# Patient Record
Sex: MALE | Race: OTHER
[De-identification: names, ages, dates, MRNs, and addresses within clinical notes are randomized per-mention and may not be internally consistent; named-entity substitution may affect disease eponyms.]

---

## 2018-02-24 ENCOUNTER — HOSPITAL ENCOUNTER (EMERGENCY)
Dept: HOSPITAL 72 - EMR | Age: 48
Discharge: HOME | End: 2018-02-24
Payer: SELF-PAY

## 2018-02-24 VITALS — BODY MASS INDEX: 39.99 KG/M2 | HEIGHT: 69 IN | WEIGHT: 270 LBS

## 2018-02-24 VITALS — DIASTOLIC BLOOD PRESSURE: 84 MMHG | SYSTOLIC BLOOD PRESSURE: 154 MMHG

## 2018-02-24 DIAGNOSIS — G93.0: ICD-10-CM

## 2018-02-24 DIAGNOSIS — R51: ICD-10-CM

## 2018-02-24 DIAGNOSIS — R42: ICD-10-CM

## 2018-02-24 DIAGNOSIS — I10: Primary | ICD-10-CM

## 2018-02-24 LAB
ADD MANUAL DIFF: YES
ALBUMIN SERPL-MCNC: 4 G/DL (ref 3.4–5)
ALBUMIN/GLOB SERPL: 0.9 {RATIO} (ref 1–2.7)
ALP SERPL-CCNC: 111 U/L (ref 46–116)
ALT SERPL-CCNC: 27 U/L (ref 12–78)
ANION GAP SERPL CALC-SCNC: 7 MMOL/L (ref 5–15)
APPEARANCE UR: CLEAR
APTT PPP: (no result) S
AST SERPL-CCNC: 15 U/L (ref 15–37)
BILIRUB SERPL-MCNC: 0.4 MG/DL (ref 0.2–1)
BUN SERPL-MCNC: 11 MG/DL (ref 7–18)
CALCIUM SERPL-MCNC: 9.3 MG/DL (ref 8.5–10.1)
CHLORIDE SERPL-SCNC: 98 MMOL/L (ref 98–107)
CO2 SERPL-SCNC: 30 MMOL/L (ref 21–32)
CREAT SERPL-MCNC: 1.1 MG/DL (ref 0.55–1.3)
ERYTHROCYTE [DISTWIDTH] IN BLOOD BY AUTOMATED COUNT: 12 % (ref 11.6–14.8)
GLOBULIN SER-MCNC: 4.5 G/DL
GLUCOSE UR STRIP-MCNC: (no result) MG/DL
HCT VFR BLD CALC: 46.7 % (ref 42–52)
HGB BLD-MCNC: 16 G/DL (ref 14.2–18)
KETONES UR QL STRIP: (no result)
LEUKOCYTE ESTERASE UR QL STRIP: NEGATIVE
MCV RBC AUTO: 89 FL (ref 80–99)
NITRITE UR QL STRIP: NEGATIVE
PH UR STRIP: 6.5 [PH] (ref 4.5–8)
PLATELET # BLD: 255 K/UL (ref 150–450)
POTASSIUM SERPL-SCNC: 3.7 MMOL/L (ref 3.5–5.1)
PROT UR QL STRIP: (no result)
RBC # BLD AUTO: 5.25 M/UL (ref 4.7–6.1)
SODIUM SERPL-SCNC: 135 MMOL/L (ref 136–145)
SP GR UR STRIP: 1.01 (ref 1–1.03)
UROBILINOGEN UR-MCNC: NORMAL MG/DL (ref 0–1)
WBC # BLD AUTO: 12.9 K/UL (ref 4.8–10.8)

## 2018-02-24 PROCEDURE — 70450 CT HEAD/BRAIN W/O DYE: CPT

## 2018-02-24 PROCEDURE — 84484 ASSAY OF TROPONIN QUANT: CPT

## 2018-02-24 PROCEDURE — 99284 EMERGENCY DEPT VISIT MOD MDM: CPT

## 2018-02-24 PROCEDURE — 96374 THER/PROPH/DIAG INJ IV PUSH: CPT

## 2018-02-24 PROCEDURE — 85007 BL SMEAR W/DIFF WBC COUNT: CPT

## 2018-02-24 PROCEDURE — 36415 COLL VENOUS BLD VENIPUNCTURE: CPT

## 2018-02-24 PROCEDURE — 80307 DRUG TEST PRSMV CHEM ANLYZR: CPT

## 2018-02-24 PROCEDURE — 80053 COMPREHEN METABOLIC PANEL: CPT

## 2018-02-24 PROCEDURE — 96375 TX/PRO/DX INJ NEW DRUG ADDON: CPT

## 2018-02-24 PROCEDURE — 85025 COMPLETE CBC W/AUTO DIFF WBC: CPT

## 2018-02-24 PROCEDURE — 81003 URINALYSIS AUTO W/O SCOPE: CPT

## 2018-02-24 NOTE — EMERGENCY ROOM REPORT
History of Present Illness


General


Chief Complaint:  Hypertension


Source:  Patient





Present Illness


HPI


Patient is a 47-year-old male who presented after increased dizziness and 

headache.  Patient reported having a no definite past medical history.  He 

reports having increased vomiting as well as headache.  He denies any recent 

trauma.  He reports drinking alcohol 4 days ago but does not drink alcohol 

daily.Patient denies any fever.  He denies any abdominal pain.


Allergies:  


Coded Allergies:  


     No Known Allergies (Unverified , 2/24/18)





Patient History


Past Medical History:  see triage record


Reviewed Nursing Documentation:  PMH: Agreed, PSxH: Agreed





Nursing Documentation-PMH


Past Medical History:  No Stated History


Hx Hypertension:  Yes





Review of Systems


All Other Systems:  negative except mentioned in HPI





Physical Exam





Vital Signs








  Date Time  Temp Pulse Resp B/P (MAP) Pulse Ox O2 Delivery O2 Flow Rate FiO2


 


2/24/18 14:59 97.4 76 22 197/108 95 Room Air  





 97.3       








Sp02 EP Interpretation:  reviewed, normal


General Appearance:  normal inspection, well appearing, no apparent distress, 

alert, GCS 15


Head:  atraumatic


ENT:  normal ENT inspection, hearing grossly normal, normal voice


Neck:  normal inspection, full range of motion, supple, no bony tend


Respiratory:  normal inspection, lungs clear, normal breath sounds, no 

respiratory distress, no retraction, no wheezing


Cardiovascular #1:  regular rate, rhythm, no edema


Gastrointestinal:  normal inspection, normal bowel sounds, non tender, soft, no 

guarding, no hernia


Genitourinary:  no CVA tenderness


Musculoskeletal:  normal inspection, back normal, normal range of motion


Neurologic:  normal inspection, alert, responsive, speech normal


Psychiatric:  normal inspection, judgement/insight normal, mood/affect normal


Skin:  normal inspection, normal color, no rash





Medical Decision Making


Diagnostic Impression:  


 Primary Impression:  


 Hypertension


 Additional Impression:  


 Arachnoid cyst


ER Course


Patient presented for headache.Differential diagnoses included but was not 

limited to skull fracture, subarachnoid hemorrhage, meningitis, aneurysm, mass 

lesion, intracranial hemorrhage.


Because of complexity of patient's case laboratory testing and imaging studies 

were ordered.


CT the head read by radiology showed evidence of arachnoid cyst there is no 

evidence of acute hemorrhage or CVA.  Patient was given medications for 

hypertension.  Patient had subsequent improvement in his headache.  The patient 

given prescription for blood pressure medications.The patient is advised to 

follow up with  primary care doctor in 1-2 days.  Patient is advised to return 

if any worsening condition or if any changes in status that are concerning.





This report is dictated with Dragon transcription software which may 

occasionally lead to discrepancies related to use of this software.





Labs








Test


  2/24/18


15:29 2/24/18


16:45


 


White Blood Count


  12.9 K/UL


(4.8-10.8) 


 


 


Red Blood Count


  5.25 M/UL


(4.70-6.10) 


 


 


Hemoglobin


  16.0 G/DL


(14.2-18.0) 


 


 


Hematocrit


  46.7 %


(42.0-52.0) 


 


 


Mean Corpuscular Volume 89 FL (80-99)  


 


Mean Corpuscular Hemoglobin


  30.5 PG


(27.0-31.0) 


 


 


Mean Corpuscular Hemoglobin


Concent 34.3 G/DL


(32.0-36.0) 


 


 


Red Cell Distribution Width


  12.0 %


(11.6-14.8) 


 


 


Platelet Count


  255 K/UL


(150-450) 


 


 


Mean Platelet Volume


  8.0 FL


(6.5-10.1) 


 


 


Neutrophils (%) (Auto)  % (45.0-75.0)  


 


Lymphocytes (%) (Auto)  % (20.0-45.0)  


 


Monocytes (%) (Auto)  % (1.0-10.0)  


 


Eosinophils (%) (Auto)  % (0.0-3.0)  


 


Basophils (%) (Auto)  % (0.0-2.0)  


 


Differential Total Cells


Counted 100 


  


 


 


Neutrophils % (Manual) 87 % (45-75)  


 


Lymphocytes % (Manual) 9 % (20-45)  


 


Monocytes % (Manual) 3 % (1-10)  


 


Eosinophils % (Manual) 0 % (0-3)  


 


Basophils % (Manual) 1 % (0-2)  


 


Band Neutrophils 0 % (0-8)  


 


Platelet Estimate Adequate  


 


Platelet Morphology Normal  


 


Red Blood Cell Morphology Normal  


 


Sodium Level


  135 MMOL/L


(136-145) 


 


 


Potassium Level


  3.7 MMOL/L


(3.5-5.1) 


 


 


Chloride Level


  98 MMOL/L


() 


 


 


Carbon Dioxide Level


  30 MMOL/L


(21-32) 


 


 


Anion Gap


  7 mmol/L


(5-15) 


 


 


Blood Urea Nitrogen


  11 mg/dL


(7-18) 


 


 


Creatinine


  1.1 MG/DL


(0.55-1.30) 


 


 


Estimat Glomerular Filtration


Rate > 60 mL/min


(>60) 


 


 


Glucose Level


  280 MG/DL


() 


 


 


Calcium Level


  9.3 MG/DL


(8.5-10.1) 


 


 


Total Bilirubin


  0.4 MG/DL


(0.2-1.0) 


 


 


Aspartate Amino Transf


(AST/SGOT) 15 U/L (15-37) 


  


 


 


Alanine Aminotransferase


(ALT/SGPT) 27 U/L (12-78) 


  


 


 


Alkaline Phosphatase


  111 U/L


() 


 


 


Troponin I


  0.000 ng/mL


(0.000-0.056) 


 


 


Total Protein


  8.5 G/DL


(6.4-8.2) 


 


 


Albumin


  4.0 G/DL


(3.4-5.0) 


 


 


Globulin 4.5 g/dL  


 


Albumin/Globulin Ratio 0.9 (1.0-2.7)  


 


Urine Color  Pale yellow 


 


Urine Appearance  Clear 


 


Urine pH  6.5 (4.5-8.0) 


 


Urine Specific Gravity


  


  1.015


(1.005-1.035)


 


Urine Protein  2+ (NEGATIVE) 


 


Urine Glucose (UA)  4+ (NEGATIVE) 


 


Urine Ketones  1+ (NEGATIVE) 


 


Urine Occult Blood


  


  Negative


(NEGATIVE)


 


Urine Nitrite


  


  Negative


(NEGATIVE)


 


Urine Bilirubin


  


  Negative


(NEGATIVE)


 


Urine Urobilinogen


  


  Normal MG/DL


(0.0-1.0)


 


Urine Leukocyte Esterase


  


  Negative


(NEGATIVE)


 


Urine RBC


  


  0-2 /HPF (0 -


0)


 


Urine WBC


  


  0-2 /HPF (0 -


0)


 


Urine Squamous Epithelial


Cells 


  None /LPF


(NONE/OCC)


 


Urine Bacteria


  


  Few /HPF


(NONE)


 


Urine Opiates Screen


  


  Negative


(NEGATIVE)


 


Urine Barbiturates Screen


  


  Negative


(NEGATIVE)


 


Phencyclidine (PCP) Screen


  


  Negative


(NEGATIVE)


 


Urine Amphetamines Screen


  


  Negative


(NEGATIVE)


 


Urine Benzodiazepines Screen


  


  Negative


(NEGATIVE)


 


Urine Cocaine Screen


  


  Negative


(NEGATIVE)


 


Urine Marijuana (THC) Screen


  


  Negative


(NEGATIVE)











Last Vital Signs








  Date Time  Temp Pulse Resp B/P (MAP) Pulse Ox O2 Delivery O2 Flow Rate FiO2


 


2/24/18 14:59 97.4 76 22 197/108 95 Room Air  





 97.3       








Status:  improved


Disposition:  HOME, SELF-CARE


Condition:  Stable


Scripts


Cephalexin* (KEFLEX*) 500 Mg Capsule


500 MG ORAL Q6H, #28 CAP 0 Refills


   Prov: Joshua Hassan         2/24/18 


Ondansetron (Zofran) 4 Mg Tablet


4 MG ORAL Q6H Y for Nausea & Vomiting, #30 TAB 0 Refills


   Prov: Joshua Hassan         2/24/18 


Atenolol* (TENORMIN*) 25 Mg Tablet


25 MG ORAL DAILY, #30 TAB


   Prov: Joshua Hassan         2/24/18











Joshua Hassan Feb 24, 2018 15:18

## 2018-02-25 NOTE — DIAGNOSTIC IMAGING REPORT
Indication: Headache

 

Technique: Continuous helical CT scanning of the head was performed without

intravenous contrast material. Axial and coronal 5 mm sections were generated.

 

Dose:

Total Dose Length Product - DLP 1467 mGycm.

Volume CT Dose Index - CTDIvol(s) 70.38 mGy.

Automated exposure control was utilized for dose reduction.

 

Comparison:None.

 

Findings: The ventricles are normal. There is a CSF density fluid collection in the

posterior fossa on the left. This appears to elevate the tentorium. Its irregular

shape makes judgment difficult but it appears to measure approximately 4.2 x 4.4 x

5.4 cm. There is no shift of midline structures. No abnormal extra-axial fluid

collections are noted. There is no evidence of intracerebral bleeding. No other

abnormal high or low density areas are noted within the brain.

 

Impression: CSF fluid collection in the left posterior fossa, likely representing an

arachnoid cyst.

 

Otherwise negative CT scan of the head without contrast material.

 

The above report is concordant with preliminary reading by [facility] .

 

 

 

The CT scanner at [facility] is accredited by the [employer]

[employer]iology and the scans are performed using protocols designed to limit radiation

exposure to as low as reasonably achievable to attain images of sufficient resolution

adequate for diagnostic evaluation.